# Patient Record
Sex: MALE | Race: WHITE | Employment: OTHER | URBAN - METROPOLITAN AREA
[De-identification: names, ages, dates, MRNs, and addresses within clinical notes are randomized per-mention and may not be internally consistent; named-entity substitution may affect disease eponyms.]

---

## 2019-03-23 ENCOUNTER — HOSPITAL ENCOUNTER (OUTPATIENT)
Dept: SLEEP CENTER | Age: 49
Discharge: HOME | End: 2019-03-23
Payer: COMMERCIAL

## 2019-03-23 DIAGNOSIS — R06.89 GASPING FOR BREATH: ICD-10-CM

## 2019-03-23 DIAGNOSIS — E66.9 CLASS 1 OBESITY WITH BODY MASS INDEX (BMI) OF 32.0 TO 32.9 IN ADULT, UNSPECIFIED OBESITY TYPE, UNSPECIFIED WHETHER SERIOUS COMORBIDITY PRESENT: Primary | ICD-10-CM

## 2019-03-23 DIAGNOSIS — G47.33 OSA (OBSTRUCTIVE SLEEP APNEA): ICD-10-CM

## 2019-03-23 DIAGNOSIS — R06.83 SNORES: ICD-10-CM

## 2019-03-23 PROCEDURE — 95810 POLYSOM 6/> YRS 4/> PARAM: CPT

## 2019-03-24 VITALS
WEIGHT: 225 LBS | SYSTOLIC BLOOD PRESSURE: 143 MMHG | OXYGEN SATURATION: 97 % | HEIGHT: 70 IN | DIASTOLIC BLOOD PRESSURE: 93 MMHG | HEART RATE: 59 BPM | BODY MASS INDEX: 32.21 KG/M2

## 2019-03-24 ASSESSMENT — SLEEP AND FATIGUE QUESTIONNAIRES
HOW LIKELY ARE YOU TO NOD OFF OR FALL ASLEEP WHILE WATCHING TV: 3
HOW LIKELY ARE YOU TO NOD OFF OR FALL ASLEEP IN A CAR, WHILE STOPPED FOR A FEW MINUTES IN TRAFFIC: 0
HOW LIKELY ARE YOU TO NOD OFF OR FALL ASLEEP WHILE SITTING AND TALKING TO SOMEONE: 1
HOW LIKELY ARE YOU TO NOD OFF OR FALL ASLEEP WHILE LYING DOWN TO REST IN THE AFTERNOON WHEN CIRCUMSTANCES PERMIT: 3
HOW LIKELY ARE YOU TO NOD OFF OR FALL ASLEEP WHILE SITTING QUIETLY AFTER LUNCH WITHOUT ALCOHOL: 1
ESS TOTAL SCORE: 14
HOW LIKELY ARE YOU TO NOD OFF OR FALL ASLEEP WHILE SITTING INACTIVE IN A PUBLIC PLACE: 1
HOW LIKELY ARE YOU TO NOD OFF OR FALL ASLEEP WHILE SITTING AND READING: 3
HOW LIKELY ARE YOU TO NOD OFF OR FALL ASLEEP WHEN YOU ARE A PASSENGER IN A CAR FOR AN HOUR WITHOUT A BREAK: 2

## 2019-03-26 ENCOUNTER — TELEPHONE (OUTPATIENT)
Dept: ADMINISTRATIVE | Age: 49
End: 2019-03-26

## 2019-04-05 PROBLEM — R94.31 ABNORMAL EKG: Status: ACTIVE | Noted: 2019-04-05

## 2019-04-11 ENCOUNTER — TELEPHONE (OUTPATIENT)
Dept: CARDIOLOGY CLINIC | Age: 49
End: 2019-04-11

## 2019-04-11 ENCOUNTER — OFFICE VISIT (OUTPATIENT)
Dept: CARDIOLOGY CLINIC | Age: 49
End: 2019-04-11
Payer: COMMERCIAL

## 2019-04-11 VITALS
DIASTOLIC BLOOD PRESSURE: 84 MMHG | BODY MASS INDEX: 31.92 KG/M2 | SYSTOLIC BLOOD PRESSURE: 122 MMHG | HEIGHT: 70 IN | RESPIRATION RATE: 16 BRPM | WEIGHT: 223 LBS | HEART RATE: 64 BPM

## 2019-04-11 DIAGNOSIS — R94.31 ABNORMAL EKG: ICD-10-CM

## 2019-04-11 DIAGNOSIS — R94.31 ABNORMAL EKG: Primary | ICD-10-CM

## 2019-04-11 PROCEDURE — G8427 DOCREV CUR MEDS BY ELIG CLIN: HCPCS | Performed by: INTERNAL MEDICINE

## 2019-04-11 PROCEDURE — 93000 ELECTROCARDIOGRAM COMPLETE: CPT | Performed by: INTERNAL MEDICINE

## 2019-04-11 PROCEDURE — 99244 OFF/OP CNSLTJ NEW/EST MOD 40: CPT | Performed by: INTERNAL MEDICINE

## 2019-04-11 PROCEDURE — G8417 CALC BMI ABV UP PARAM F/U: HCPCS | Performed by: INTERNAL MEDICINE

## 2019-04-11 RX ORDER — ERGOCALCIFEROL 1.25 MG/1
CAPSULE ORAL
Refills: 0 | COMMUNITY
Start: 2019-03-19

## 2019-04-11 NOTE — PROGRESS NOTES
activity: Not on file   Other Topics Concern    Not on file   Social History Narrative    Not on file       History reviewed. No pertinent family history. SUBJECTIVE: Wanda Nance presents to the office today for cardiology consult - dr. Khadra Barreto. He complains of various atypical symptoms, but also years of sob with exertion and denies   chest pain, palpitations, paroxysmal nocturnal dyspnea and syncope. Works as         Review of Systems:   Heart: as above   Lungs: as above   Eyes: denies changes in vision or discharge. Ears: denies changes in hearing or pain. Nose: denies epistaxis or masses   Throat: denies sore throat or trouble swallowing. Neuro: denies numbness, tingling, tremors. Skin: denies rashes or itching. : denies hematuria, dysuria   GI: denies vomiting, diarrhea   Psych: denies mood changed, anxiety, depression. all others negative. Physical Exam   /84   Pulse 64   Resp 16   Ht 5' 10\" (1.778 m)   Wt 223 lb (101.2 kg)   BMI 32.00 kg/m²   Constitutional: Oriented to person, place, and time. Well-developed and well-nourished. No distress. Head: Normocephalic and atraumatic. Eyes: EOM are normal. Pupils are equal, round, and reactive to light. Neck: Normal range of motion. Neck supple. No hepatojugular reflux and no JVD present. Carotid bruit is not present. No tracheal deviation present. No thyromegaly present. Cardiovascular: Normal rate, regular rhythm, normal heart sounds and intact distal pulses. Exam reveals no gallop and no friction rub. No murmur heard. Pulmonary/Chest: Effort normal and breath sounds normal. No respiratory distress. No wheezes. No rales. No tenderness. Abdominal: Soft. Bowel sounds are normal. No distension and no mass. No tenderness. No rebound and no guarding. Musculoskeletal: Normal range of motion. No edema and no tenderness. Lymphadenopathy:   No cervical adenopathy. No groin adenopathy.   Neurological: Alert and oriented to person, place, and time. Skin: Skin is warm and dry. No rash noted. Not diaphoretic. No erythema. Psychiatric: Normal mood and affect. Behavior is normal.     EKG:  normal EKG, normal sinus rhythm, unchanged from previous tracings.     ASSESSMENT AND PLAN:  Patient Active Problem List   Diagnosis    Abnormal EKG     NORMAL ekg at PCP and here  Normal exam  Likely non cardiac symptoms, but does do physical labor, so will do ETT-R      LIDIA DormanT[facility]

## 2019-04-15 ENCOUNTER — HOSPITAL ENCOUNTER (OUTPATIENT)
Dept: NON INVASIVE DIAGNOSTICS | Age: 49
Discharge: HOME | End: 2019-04-15
Payer: COMMERCIAL

## 2019-04-15 DIAGNOSIS — R94.31 ABNORMAL EKG: ICD-10-CM

## 2019-04-15 PROCEDURE — 93017 CV STRESS TEST TRACING ONLY: CPT

## 2019-04-15 PROCEDURE — 93016 CV STRESS TEST SUPVJ ONLY: CPT | Performed by: INTERNAL MEDICINE

## 2019-04-15 PROCEDURE — 93018 CV STRESS TEST I&R ONLY: CPT | Performed by: INTERNAL MEDICINE

## 2019-04-15 NOTE — PROCEDURES
Exercise Non-Nuclear Stress Test:    Cardiologist: Dr. Marvin Rodas    Date: 4/15/2019    Indications for study: Chest pain    1. Chest tightness at peak exercise  2. Exercise time: 5:30, METs: 7, MPHR: 80%  3. No new arrhythmias  4. 1.9 MM Horizontal ST depression in leads V5-6 in mid portion stage II, resolved to baseline in two minutes  5.  Positive exercise non-nuclear stress test

## 2019-04-16 ENCOUNTER — TELEPHONE (OUTPATIENT)
Dept: CARDIOLOGY CLINIC | Age: 49
End: 2019-04-16

## 2019-04-16 RX ORDER — METOPROLOL SUCCINATE 50 MG/1
50 TABLET, EXTENDED RELEASE ORAL DAILY
Qty: 30 TABLET | Refills: 5 | Status: SHIPPED | OUTPATIENT
Start: 2019-04-16

## 2019-04-16 RX ORDER — ATORVASTATIN CALCIUM 10 MG/1
10 TABLET, FILM COATED ORAL DAILY
Qty: 30 TABLET | Refills: 5 | Status: SHIPPED | OUTPATIENT
Start: 2019-04-16

## 2019-04-16 NOTE — TELEPHONE ENCOUNTER
After receiving his lipids from his family physician.  His Lipitor dose was changed per Dr. Darshan Romero to 40 mg qd     Pharmacy notified of the change

## 2019-04-16 NOTE — TELEPHONE ENCOUNTER
Per Dr. Cristopher Ordonez, Please have patient start on the following medications:     Toprol XL 50 mg q am  Lipitor 10 mg q am     Please make sure he is also taking a baby ASA 81 mg qd and follow-up in one month     Patient notified and instructed on all medications and follow-up.   He stated understanding

## 2019-05-03 PROBLEM — E78.2 MIXED HYPERLIPIDEMIA: Status: ACTIVE | Noted: 2019-05-03

## 2019-05-03 PROBLEM — I20.9 ANGINA PECTORIS (HCC): Status: ACTIVE | Noted: 2019-05-03

## 2019-05-07 ENCOUNTER — TELEPHONE (OUTPATIENT)
Dept: ADMINISTRATIVE | Age: 49
End: 2019-05-07

## 2019-05-07 NOTE — TELEPHONE ENCOUNTER
Pt calling to cancel his f/u after stress tomorrow with Dr. Matthew Parekh. States he is not going to follow with Dr. Matthew Parekh - he does not like \"being bullied\" into taking medications. Dr. Yemi Johnson office notified and apt cancelled.

## 2019-05-14 ENCOUNTER — HOSPITAL ENCOUNTER (OUTPATIENT)
Dept: SLEEP CENTER | Age: 49
Discharge: HOME | End: 2019-05-14
Payer: COMMERCIAL

## 2019-05-14 DIAGNOSIS — G47.33 OSA (OBSTRUCTIVE SLEEP APNEA): ICD-10-CM

## 2019-05-14 PROCEDURE — 95811 POLYSOM 6/>YRS CPAP 4/> PARM: CPT

## 2019-05-15 VITALS
SYSTOLIC BLOOD PRESSURE: 123 MMHG | BODY MASS INDEX: 32.21 KG/M2 | HEIGHT: 70 IN | WEIGHT: 225 LBS | OXYGEN SATURATION: 97 % | DIASTOLIC BLOOD PRESSURE: 80 MMHG | HEART RATE: 71 BPM

## 2019-05-22 NOTE — PROGRESS NOTES
[address] [address]                               SLEEP STUDY REPORT    PATIENT NAME: Montell Bernheim                       :        1970  MED REC NO:   [id number]                            ROOM:  ACCOUNT NO:   [de-identified]                           ADMIT DATE: 2019  PROVIDER:     Ct Trejo MD    DATE OF STUDY:  2019    TITRATION STUDY    INDICATION FOR STUDY:  The patient had a positive test for obstructive  sleep apnea on 2019 with an apnea/hypopnea index of 13 and lowest  oxygen saturation of 76%. The patient has a BMI of 32.3 and neck  circumference of 17.75 inches and an Miami Score Scale of 14/24. SLEEP ARCHITECTURE:  Total recording time was 382.9 minutes. Total  sleep time was 340 minutes. Sleep efficacy was 88.8%. Sleep latency  was 6.8 minutes and REM latency was 139.5 minutes. SLEEP STAGES:  The patient spent 5.3% in N1, 66.5% in N2, 12.2% in N3,  and 16% in REM sleep. RESPIRATORY EVENTS:  During the study, the patient had 45 apneas, which  of those 31 were central and 14 were obstructive. The longest duration  of an apneic event was 30.9 seconds. HYPOPNEAS:  During the study, the patient had 24 hypopneas. The longest  duration of the hypopneic events was 43 seconds. The apnea/hypopnea  index was 12.2. AROUSALS:  During the study, the patient had total of 39 arousals and  awakenings, which of those 26 were spontaneous. The index was 6.9. LEG MOVEMENTS:  During the study, the patient had total of 21 leg  movements, which of those only 2 were of periodic leg movement variety  and the leg movement index was 3.7. OXYGEN SATURATIONS:  The patient's mean oxygen saturation while awake on  room air was 95.5%. The patient's mean oxygen saturation during total  sleep was 94.2%.   The patient spent 5.4 minutes with oxygen saturations  below 88 and the lowest oxygen saturation was 79%. HEART RATE SUMMARY:  The patient's average heart rate while awake was  66.6 beats per minute. The patient's average heart rate during steady  sleep was 57.1 beats per minute. The patient was not reported to have  any PACs or PVCs. TITRATION:  The patient was started on a bilevel of 8/4 and pressures  were increased for elimination of events. Unfortunately, when pressures  were increased, the patient developed having central apneas with  worsening of his apnea/hypopnea index. This was consistent with  overtitration. The patient's lowest oxygen saturation was 82.0.    RECOMMENDATIONS:  This was an inadequate titration study. Would  recommend an Auto-BiPAP for this patient with a setting of EPAP of 4 to  10 and an IPAP setting of 8 to 15. The patient will need also a  20-minute ramp and a humidifier along with the medium Respironics  DreamWear nasal mask.         Rodney Carbajal MD      D: 05/21/2019 13:43:21       T: 05/21/2019 23:46:37     [email or web address]  Job#: [id number]     Doc#: [id number]    CC:

## 2024-02-05 ENCOUNTER — HOSPITAL ENCOUNTER (OUTPATIENT)
Age: 54
Discharge: HOME | End: 2024-02-05
Payer: COMMERCIAL

## 2024-02-05 LAB
BUN SERPL-MCNC: 19 MG/DL (ref 6–20)
CREAT SERPL-MCNC: 1.1 MG/DL (ref 0.7–1.2)
GFR SERPL CREATININE-BSD FRML MDRD: >60 ML/MIN/1.73M2

## 2024-02-05 PROCEDURE — 84520 ASSAY OF UREA NITROGEN: CPT

## 2024-02-05 PROCEDURE — 82565 ASSAY OF CREATININE: CPT

## 2024-02-05 PROCEDURE — 36415 COLL VENOUS BLD VENIPUNCTURE: CPT

## 2024-02-07 ENCOUNTER — HOSPITAL ENCOUNTER (OUTPATIENT)
Dept: CT IMAGING | Age: 54
Discharge: HOME | End: 2024-02-09
Payer: COMMERCIAL

## 2024-02-07 VITALS
BODY MASS INDEX: 31.5 KG/M2 | OXYGEN SATURATION: 98 % | HEART RATE: 52 BPM | RESPIRATION RATE: 18 BRPM | WEIGHT: 220 LBS | SYSTOLIC BLOOD PRESSURE: 138 MMHG | HEIGHT: 70 IN | DIASTOLIC BLOOD PRESSURE: 81 MMHG

## 2024-02-07 DIAGNOSIS — E78.5 HYPERLIPIDEMIA, UNSPECIFIED HYPERLIPIDEMIA TYPE: ICD-10-CM

## 2024-02-07 DIAGNOSIS — I10 HYPERTENSION, ESSENTIAL: ICD-10-CM

## 2024-02-07 DIAGNOSIS — R06.00 DYSPNEA, UNSPECIFIED TYPE: ICD-10-CM

## 2024-02-07 PROCEDURE — 75571 CT HRT W/O DYE W/CA TEST: CPT

## 2024-02-07 PROCEDURE — 2580000003 HC RX 258: Performed by: INTERNAL MEDICINE

## 2024-02-07 PROCEDURE — 6370000000 HC RX 637 (ALT 250 FOR IP): Performed by: INTERNAL MEDICINE

## 2024-02-07 RX ORDER — SODIUM CHLORIDE 0.9 % (FLUSH) 0.9 %
10 SYRINGE (ML) INJECTION
Status: ACTIVE | OUTPATIENT
Start: 2024-02-07 | End: 2024-02-08

## 2024-02-07 RX ORDER — NITROGLYCERIN 0.4 MG/1
0.8 TABLET SUBLINGUAL ONCE
Status: DISCONTINUED | OUTPATIENT
Start: 2024-02-07 | End: 2024-02-10 | Stop reason: HOSPADM

## 2024-02-07 RX ORDER — 0.9 % SODIUM CHLORIDE 0.9 %
1000 INTRAVENOUS SOLUTION INTRAVENOUS ONCE
Status: COMPLETED | OUTPATIENT
Start: 2024-02-07 | End: 2024-02-07

## 2024-02-07 RX ORDER — METOPROLOL TARTRATE 50 MG/1
100 TABLET, FILM COATED ORAL ONCE
Status: COMPLETED | OUTPATIENT
Start: 2024-02-07 | End: 2024-02-07

## 2024-02-07 RX ORDER — ROSUVASTATIN CALCIUM 40 MG/1
40 TABLET, COATED ORAL DAILY
COMMUNITY
Start: 2024-01-21

## 2024-02-07 RX ORDER — ROSUVASTATIN CALCIUM 20 MG/1
20 TABLET, COATED ORAL DAILY
COMMUNITY
Start: 2023-11-04

## 2024-02-07 RX ADMIN — SODIUM CHLORIDE 1000 ML: 9 INJECTION, SOLUTION INTRAVENOUS at 08:14

## 2024-02-07 RX ADMIN — METOPROLOL TARTRATE 100 MG: 50 TABLET ORAL at 08:14

## 2024-02-07 NOTE — PROCEDURES
0745Patient arrived via for CTA coronary arteries. Allergies reviewed, instructions given to  include protocol for heart rate, b/p, necessary medications that will be given, IV site and proper breath hold during scan. Questions answered and expressed understanding confirmed. Placed on monitoring devices for heart rate and rhythm, B/P taken,  IV flushed / started, flushed and prn adapter attached.      0800 Dr Main notified of patient arrival, history, test/lab results, home medications, vital signs and rhythm.  Orders received.

## 2024-02-07 NOTE — PROCEDURES
0945 Return from cta, exam cancelled by Dr Main d/t calcium score of 1551.      0950 pt d/c home with instructions to follow up with ordering physician and cardiology.    Dr Arreaga's office faxed to notify of procedure cancel.

## 2024-05-02 ENCOUNTER — HOSPITAL ENCOUNTER (EMERGENCY)
Age: 54
Discharge: ANOTHER HOSPITAL | End: 2024-05-02
Attending: EMERGENCY MEDICINE
Payer: COMMERCIAL

## 2024-05-02 ENCOUNTER — APPOINTMENT (OUTPATIENT)
Dept: GENERAL RADIOLOGY | Age: 54
End: 2024-05-02
Payer: COMMERCIAL

## 2024-05-02 VITALS
OXYGEN SATURATION: 98 % | TEMPERATURE: 98 F | BODY MASS INDEX: 33.72 KG/M2 | WEIGHT: 235 LBS | DIASTOLIC BLOOD PRESSURE: 81 MMHG | HEART RATE: 69 BPM | RESPIRATION RATE: 18 BRPM | SYSTOLIC BLOOD PRESSURE: 156 MMHG

## 2024-05-02 DIAGNOSIS — I20.0 UNSTABLE ANGINA (HCC): Primary | ICD-10-CM

## 2024-05-02 LAB
ALBUMIN SERPL-MCNC: 4.5 G/DL (ref 3.5–5.2)
ALP SERPL-CCNC: 63 U/L (ref 40–129)
ALT SERPL-CCNC: 39 U/L (ref 0–40)
AMYLASE SERPL-CCNC: 64 U/L (ref 20–100)
ANION GAP SERPL CALCULATED.3IONS-SCNC: 12 MMOL/L (ref 7–16)
AST SERPL-CCNC: 25 U/L (ref 0–39)
BILIRUB DIRECT SERPL-MCNC: <0.2 MG/DL (ref 0–0.3)
BILIRUB INDIRECT SERPL-MCNC: NORMAL MG/DL (ref 0–1)
BILIRUB SERPL-MCNC: 0.3 MG/DL (ref 0–1.2)
BNP SERPL-MCNC: <26 PG/ML (ref 0–125)
BUN SERPL-MCNC: 23 MG/DL (ref 6–20)
CALCIUM SERPL-MCNC: 9.1 MG/DL (ref 8.6–10.2)
CHLORIDE SERPL-SCNC: 100 MMOL/L (ref 98–107)
CK SERPL-CCNC: 403 U/L (ref 20–200)
CO2 SERPL-SCNC: 26 MMOL/L (ref 22–29)
CREAT SERPL-MCNC: 1.1 MG/DL (ref 0.7–1.2)
ERYTHROCYTE [DISTWIDTH] IN BLOOD BY AUTOMATED COUNT: 11.7 % (ref 11.5–15)
GFR, ESTIMATED: 80 ML/MIN/1.73M2
GLUCOSE SERPL-MCNC: 130 MG/DL (ref 74–99)
HCT VFR BLD AUTO: 39 % (ref 37–54)
HGB BLD-MCNC: 14.1 G/DL (ref 12.5–16.5)
INR PPP: 1.1
LIPASE SERPL-CCNC: 24 U/L (ref 13–60)
MAGNESIUM SERPL-MCNC: 2.1 MG/DL (ref 1.6–2.6)
MCH RBC QN AUTO: 32 PG (ref 26–35)
MCHC RBC AUTO-ENTMCNC: 36.2 G/DL (ref 32–34.5)
MCV RBC AUTO: 88.4 FL (ref 80–99.9)
PARTIAL THROMBOPLASTIN TIME: 26.8 SEC (ref 24.5–35.1)
PARTIAL THROMBOPLASTIN TIME: 66.3 SEC (ref 24.5–35.1)
PLATELET # BLD AUTO: 251 K/UL (ref 130–450)
PMV BLD AUTO: 10 FL (ref 7–12)
POTASSIUM SERPL-SCNC: 3.7 MMOL/L (ref 3.5–5)
PROT SERPL-MCNC: 7.2 G/DL (ref 6.4–8.3)
PROTHROMBIN TIME: 12.2 SEC (ref 9.3–12.4)
RBC # BLD AUTO: 4.41 M/UL (ref 3.8–5.8)
SODIUM SERPL-SCNC: 138 MMOL/L (ref 132–146)
TROPONIN I SERPL HS-MCNC: 8 NG/L (ref 0–11)
TROPONIN I SERPL HS-MCNC: 8 NG/L (ref 0–11)
WBC OTHER # BLD: 7.6 K/UL (ref 4.5–11.5)

## 2024-05-02 PROCEDURE — 6360000002 HC RX W HCPCS

## 2024-05-02 PROCEDURE — 71045 X-RAY EXAM CHEST 1 VIEW: CPT

## 2024-05-02 PROCEDURE — 6370000000 HC RX 637 (ALT 250 FOR IP): Performed by: EMERGENCY MEDICINE

## 2024-05-02 PROCEDURE — 6360000002 HC RX W HCPCS: Performed by: EMERGENCY MEDICINE

## 2024-05-02 PROCEDURE — 83880 ASSAY OF NATRIURETIC PEPTIDE: CPT

## 2024-05-02 PROCEDURE — 96368 THER/DIAG CONCURRENT INF: CPT

## 2024-05-02 PROCEDURE — 85027 COMPLETE CBC AUTOMATED: CPT

## 2024-05-02 PROCEDURE — 99285 EMERGENCY DEPT VISIT HI MDM: CPT

## 2024-05-02 PROCEDURE — 80053 COMPREHEN METABOLIC PANEL: CPT

## 2024-05-02 PROCEDURE — 82248 BILIRUBIN DIRECT: CPT

## 2024-05-02 PROCEDURE — 83690 ASSAY OF LIPASE: CPT

## 2024-05-02 PROCEDURE — 85730 THROMBOPLASTIN TIME PARTIAL: CPT

## 2024-05-02 PROCEDURE — 84484 ASSAY OF TROPONIN QUANT: CPT

## 2024-05-02 PROCEDURE — 96366 THER/PROPH/DIAG IV INF ADDON: CPT

## 2024-05-02 PROCEDURE — 96374 THER/PROPH/DIAG INJ IV PUSH: CPT

## 2024-05-02 PROCEDURE — 96375 TX/PRO/DX INJ NEW DRUG ADDON: CPT

## 2024-05-02 PROCEDURE — 96365 THER/PROPH/DIAG IV INF INIT: CPT

## 2024-05-02 PROCEDURE — 82150 ASSAY OF AMYLASE: CPT

## 2024-05-02 PROCEDURE — 93005 ELECTROCARDIOGRAM TRACING: CPT | Performed by: EMERGENCY MEDICINE

## 2024-05-02 PROCEDURE — 82550 ASSAY OF CK (CPK): CPT

## 2024-05-02 PROCEDURE — 83735 ASSAY OF MAGNESIUM: CPT

## 2024-05-02 PROCEDURE — 85610 PROTHROMBIN TIME: CPT

## 2024-05-02 RX ORDER — LABETALOL HYDROCHLORIDE 5 MG/ML
5 INJECTION, SOLUTION INTRAVENOUS ONCE
Status: DISCONTINUED | OUTPATIENT
Start: 2024-05-02 | End: 2024-05-02

## 2024-05-02 RX ORDER — LABETALOL HYDROCHLORIDE 5 MG/ML
INJECTION, SOLUTION INTRAVENOUS
Status: COMPLETED
Start: 2024-05-02 | End: 2024-05-02

## 2024-05-02 RX ORDER — NITROGLYCERIN 20 MG/100ML
5-200 INJECTION INTRAVENOUS CONTINUOUS
Status: DISCONTINUED | OUTPATIENT
Start: 2024-05-02 | End: 2024-05-02 | Stop reason: HOSPADM

## 2024-05-02 RX ORDER — HEPARIN SODIUM 1000 [USP'U]/ML
2000 INJECTION, SOLUTION INTRAVENOUS; SUBCUTANEOUS PRN
Status: DISCONTINUED | OUTPATIENT
Start: 2024-05-02 | End: 2024-05-02 | Stop reason: HOSPADM

## 2024-05-02 RX ORDER — LABETALOL HYDROCHLORIDE 5 MG/ML
10 INJECTION, SOLUTION INTRAVENOUS ONCE
Status: DISCONTINUED | OUTPATIENT
Start: 2024-05-02 | End: 2024-05-02

## 2024-05-02 RX ORDER — ASPIRIN 81 MG/1
324 TABLET, CHEWABLE ORAL ONCE
Status: DISCONTINUED | OUTPATIENT
Start: 2024-05-02 | End: 2024-05-02 | Stop reason: HOSPADM

## 2024-05-02 RX ORDER — HEPARIN SODIUM 10000 [USP'U]/100ML
1000 INJECTION, SOLUTION INTRAVENOUS CONTINUOUS
Status: DISCONTINUED | OUTPATIENT
Start: 2024-05-02 | End: 2024-05-02 | Stop reason: HOSPADM

## 2024-05-02 RX ORDER — LABETALOL HYDROCHLORIDE 5 MG/ML
5 INJECTION, SOLUTION INTRAVENOUS ONCE
Status: COMPLETED | OUTPATIENT
Start: 2024-05-02 | End: 2024-05-02

## 2024-05-02 RX ORDER — HEPARIN SODIUM 1000 [USP'U]/ML
4000 INJECTION, SOLUTION INTRAVENOUS; SUBCUTANEOUS PRN
Status: DISCONTINUED | OUTPATIENT
Start: 2024-05-02 | End: 2024-05-02 | Stop reason: HOSPADM

## 2024-05-02 RX ORDER — HEPARIN SODIUM 1000 [USP'U]/ML
4000 INJECTION, SOLUTION INTRAVENOUS; SUBCUTANEOUS ONCE
Status: COMPLETED | OUTPATIENT
Start: 2024-05-02 | End: 2024-05-02

## 2024-05-02 RX ADMIN — HEPARIN SODIUM 12 UNITS/KG/HR: 10000 INJECTION, SOLUTION INTRAVENOUS at 03:23

## 2024-05-02 RX ADMIN — LABETALOL HYDROCHLORIDE 5 MG: 5 INJECTION INTRAVENOUS at 03:35

## 2024-05-02 RX ADMIN — NITROGLYCERIN 15 MCG/MIN: 20 INJECTION INTRAVENOUS at 03:59

## 2024-05-02 RX ADMIN — NITROGLYCERIN 20 MCG/MIN: 20 INJECTION INTRAVENOUS at 03:27

## 2024-05-02 RX ADMIN — LABETALOL HYDROCHLORIDE 5 MG: 5 INJECTION, SOLUTION INTRAVENOUS at 03:35

## 2024-05-02 RX ADMIN — TICAGRELOR 180 MG: 90 TABLET ORAL at 03:13

## 2024-05-02 RX ADMIN — NITROGLYCERIN 10 MCG/MIN: 20 INJECTION INTRAVENOUS at 04:51

## 2024-05-02 RX ADMIN — NITROGLYCERIN 5 MCG/MIN: 20 INJECTION INTRAVENOUS at 05:36

## 2024-05-02 RX ADMIN — HEPARIN SODIUM 4000 UNITS: 1000 INJECTION INTRAVENOUS; SUBCUTANEOUS at 03:16

## 2024-05-02 NOTE — ED PROVIDER NOTES
HPI:  5/2/24, Time: 3:01 AM EDT         Brett David is a 54 y.o. male presenting to the ED for chest heaviness going to his left armpit with shortness of breath nausea and burping but no abdominal pain, beginning months ago.  The complaint has been persistent, moderate in severity, and worsened by nothing.  He is still having chest heaviness now but he has some nonspecific left arm pain that we had he has known coronary artery disease and is actually supposed to have had a bypass earlier but he was not able to do what they want to have it this Thursday he is even though his EKG is unremarkable we had no he has unstable angina we will start him on aspirin Brilinta heparin and nitroglycerin because of his known coronary artery disease    ROS:   Pertinent positives and negatives are stated within HPI, all other systems reviewed and are negative.  --------------------------------------------- PAST HISTORY ---------------------------------------------  Past Medical History:  has a past medical history of Kidney stone and Mixed hyperlipidemia.    Past Surgical History:  has a past surgical history that includes Tonsillectomy (CHILD); Knee arthroscopy (01/01/2000); Nasal septum surgery (01/01/1992); lymph node biopsy (01/01/2004); and Endoscopy, colon, diagnostic.    Social History:  reports that he has never smoked. He has never used smokeless tobacco. He reports current drug use. Drug: Marijuana (Weed). He reports that he does not drink alcohol.    Family History: family history includes Heart Disease in his maternal grandfather and maternal grandmother.     The patient’s home medications have been reviewed.    Allergies: Shellfish-derived products and Vicodin [hydrocodone-acetaminophen]    ---------------------------------------------------PHYSICAL EXAM--------------------------------------    Constitutional/General: Alert and oriented x3, well appearing, non toxic in NAD  Head: Normocephalic and atraumatic  Eyes:

## 2024-05-04 LAB
EKG ATRIAL RATE: 73 BPM
EKG P AXIS: 53 DEGREES
EKG P-R INTERVAL: 178 MS
EKG Q-T INTERVAL: 380 MS
EKG QRS DURATION: 94 MS
EKG QTC CALCULATION (BAZETT): 418 MS
EKG R AXIS: 48 DEGREES
EKG T AXIS: 37 DEGREES
EKG VENTRICULAR RATE: 73 BPM

## 2024-05-04 PROCEDURE — 93010 ELECTROCARDIOGRAM REPORT: CPT | Performed by: INTERNAL MEDICINE

## 2024-06-06 ENCOUNTER — APPOINTMENT (OUTPATIENT)
Dept: CT IMAGING | Age: 54
End: 2024-06-06
Payer: COMMERCIAL

## 2024-06-06 ENCOUNTER — HOSPITAL ENCOUNTER (EMERGENCY)
Age: 54
Discharge: HOME | End: 2024-06-06
Attending: STUDENT IN AN ORGANIZED HEALTH CARE EDUCATION/TRAINING PROGRAM
Payer: COMMERCIAL

## 2024-06-06 VITALS
HEART RATE: 66 BPM | DIASTOLIC BLOOD PRESSURE: 88 MMHG | SYSTOLIC BLOOD PRESSURE: 158 MMHG | RESPIRATION RATE: 18 BRPM | TEMPERATURE: 97.8 F | OXYGEN SATURATION: 98 %

## 2024-06-06 DIAGNOSIS — K59.00 CONSTIPATION, UNSPECIFIED CONSTIPATION TYPE: ICD-10-CM

## 2024-06-06 DIAGNOSIS — N20.1 URETERIC STONE: Primary | ICD-10-CM

## 2024-06-06 LAB
ALBUMIN SERPL-MCNC: 4.5 G/DL (ref 3.5–5.2)
ALP SERPL-CCNC: 89 U/L (ref 40–129)
ALT SERPL-CCNC: 30 U/L (ref 0–40)
ANION GAP SERPL CALCULATED.3IONS-SCNC: 13 MMOL/L (ref 7–16)
AST SERPL-CCNC: 16 U/L (ref 0–39)
BACTERIA URNS QL MICRO: ABNORMAL
BASOPHILS # BLD: 0.02 K/UL (ref 0–0.2)
BASOPHILS NFR BLD: 0 % (ref 0–2)
BILIRUB SERPL-MCNC: 0.3 MG/DL (ref 0–1.2)
BILIRUB UR QL STRIP: NEGATIVE
BUN BLD-MCNC: 17 MG/DL (ref 6–20)
BUN SERPL-MCNC: 17 MG/DL (ref 6–20)
CALCIUM SERPL-MCNC: 9 MG/DL (ref 8.6–10.2)
CHLORIDE BLD-SCNC: 106 MMOL/L (ref 100–108)
CHLORIDE SERPL-SCNC: 104 MMOL/L (ref 98–107)
CLARITY UR: CLEAR
CO2 BLD CALC-SCNC: 24 MMOL/L (ref 22–29)
CO2 SERPL-SCNC: 23 MMOL/L (ref 22–29)
COLOR UR: YELLOW
CREAT BLD-MCNC: 0.9 MG/DL (ref 0.7–1.2)
CREAT SERPL-MCNC: 1 MG/DL (ref 0.7–1.2)
EGFR, POC: >90 ML/MIN/1.73M2
EOSINOPHIL # BLD: 0.24 K/UL (ref 0.05–0.5)
EOSINOPHILS RELATIVE PERCENT: 4 % (ref 0–6)
ERYTHROCYTE [DISTWIDTH] IN BLOOD BY AUTOMATED COUNT: 11.9 % (ref 11.5–15)
GFR, ESTIMATED: 87 ML/MIN/1.73M2
GLUCOSE BLD-MCNC: 135 MG/DL (ref 74–99)
GLUCOSE SERPL-MCNC: 135 MG/DL (ref 74–99)
GLUCOSE UR STRIP-MCNC: NEGATIVE MG/DL
HCT VFR BLD AUTO: 36.5 % (ref 37–54)
HGB BLD-MCNC: 12.7 G/DL (ref 12.5–16.5)
HGB UR QL STRIP.AUTO: ABNORMAL
IMM GRANULOCYTES # BLD AUTO: <0.03 K/UL (ref 0–0.58)
IMM GRANULOCYTES NFR BLD: 0 % (ref 0–5)
KETONES UR STRIP-MCNC: NEGATIVE MG/DL
LACTATE BLDV-SCNC: 1.5 MMOL/L (ref 0.5–2.2)
LEUKOCYTE ESTERASE UR QL STRIP: NEGATIVE
LIPASE SERPL-CCNC: 31 U/L (ref 13–60)
LYMPHOCYTES NFR BLD: 2.76 K/UL (ref 1.5–4)
LYMPHOCYTES RELATIVE PERCENT: 43 % (ref 20–42)
MCH RBC QN AUTO: 30.9 PG (ref 26–35)
MCHC RBC AUTO-ENTMCNC: 34.8 G/DL (ref 32–34.5)
MCV RBC AUTO: 88.8 FL (ref 80–99.9)
MONOCYTES NFR BLD: 0.61 K/UL (ref 0.1–0.95)
MONOCYTES NFR BLD: 10 % (ref 2–12)
NEUTROPHILS NFR BLD: 43 % (ref 43–80)
NEUTS SEG NFR BLD: 2.8 K/UL (ref 1.8–7.3)
NITRITE UR QL STRIP: NEGATIVE
PH UR STRIP: 5.5 [PH] (ref 5–9)
PLATELET # BLD AUTO: 254 K/UL (ref 130–450)
PMV BLD AUTO: 9.9 FL (ref 7–12)
POC ANION GAP: 11 MMOL/L (ref 7–16)
POTASSIUM BLD-SCNC: 3.6 MMOL/L (ref 3.5–5)
POTASSIUM SERPL-SCNC: 3.8 MMOL/L (ref 3.5–5)
PROT SERPL-MCNC: 7.2 G/DL (ref 6.4–8.3)
PROT UR STRIP-MCNC: NEGATIVE MG/DL
RBC # BLD AUTO: 4.11 M/UL (ref 3.8–5.8)
RBC #/AREA URNS HPF: ABNORMAL /HPF
SODIUM BLD-SCNC: 141 MMOL/L (ref 132–146)
SODIUM SERPL-SCNC: 140 MMOL/L (ref 132–146)
SP GR UR STRIP: 1.01 (ref 1–1.03)
UROBILINOGEN UR STRIP-ACNC: 0.2 EU/DL (ref 0–1)
WBC #/AREA URNS HPF: ABNORMAL /HPF
WBC OTHER # BLD: 6.4 K/UL (ref 4.5–11.5)

## 2024-06-06 PROCEDURE — 74177 CT ABD & PELVIS W/CONTRAST: CPT

## 2024-06-06 PROCEDURE — 6360000002 HC RX W HCPCS: Performed by: STUDENT IN AN ORGANIZED HEALTH CARE EDUCATION/TRAINING PROGRAM

## 2024-06-06 PROCEDURE — 81001 URINALYSIS AUTO W/SCOPE: CPT

## 2024-06-06 PROCEDURE — 83605 ASSAY OF LACTIC ACID: CPT

## 2024-06-06 PROCEDURE — 82947 ASSAY GLUCOSE BLOOD QUANT: CPT

## 2024-06-06 PROCEDURE — 82565 ASSAY OF CREATININE: CPT

## 2024-06-06 PROCEDURE — 83690 ASSAY OF LIPASE: CPT

## 2024-06-06 PROCEDURE — 2580000003 HC RX 258: Performed by: STUDENT IN AN ORGANIZED HEALTH CARE EDUCATION/TRAINING PROGRAM

## 2024-06-06 PROCEDURE — 84520 ASSAY OF UREA NITROGEN: CPT

## 2024-06-06 PROCEDURE — 80051 ELECTROLYTE PANEL: CPT

## 2024-06-06 PROCEDURE — 85025 COMPLETE CBC W/AUTO DIFF WBC: CPT

## 2024-06-06 PROCEDURE — 80053 COMPREHEN METABOLIC PANEL: CPT

## 2024-06-06 PROCEDURE — 96374 THER/PROPH/DIAG INJ IV PUSH: CPT

## 2024-06-06 PROCEDURE — 6360000004 HC RX CONTRAST MEDICATION: Performed by: RADIOLOGY

## 2024-06-06 PROCEDURE — 99285 EMERGENCY DEPT VISIT HI MDM: CPT

## 2024-06-06 PROCEDURE — 96375 TX/PRO/DX INJ NEW DRUG ADDON: CPT

## 2024-06-06 RX ORDER — ONDANSETRON 2 MG/ML
4 INJECTION INTRAMUSCULAR; INTRAVENOUS ONCE
Status: COMPLETED | OUTPATIENT
Start: 2024-06-06 | End: 2024-06-06

## 2024-06-06 RX ORDER — KETOROLAC TROMETHAMINE 30 MG/ML
15 INJECTION, SOLUTION INTRAMUSCULAR; INTRAVENOUS ONCE
Status: DISCONTINUED | OUTPATIENT
Start: 2024-06-06 | End: 2024-06-06

## 2024-06-06 RX ORDER — MORPHINE SULFATE 4 MG/ML
4 INJECTION, SOLUTION INTRAMUSCULAR; INTRAVENOUS ONCE
Status: COMPLETED | OUTPATIENT
Start: 2024-06-06 | End: 2024-06-06

## 2024-06-06 RX ORDER — OXYCODONE HYDROCHLORIDE 5 MG/1
5 TABLET ORAL EVERY 6 HOURS PRN
Qty: 8 TABLET | Refills: 0 | Status: SHIPPED | OUTPATIENT
Start: 2024-06-06 | End: 2024-06-11

## 2024-06-06 RX ORDER — POLYETHYLENE GLYCOL 3350 17 G/17G
17 POWDER, FOR SOLUTION ORAL DAILY PRN
COMMUNITY

## 2024-06-06 RX ORDER — 0.9 % SODIUM CHLORIDE 0.9 %
1000 INTRAVENOUS SOLUTION INTRAVENOUS ONCE
Status: COMPLETED | OUTPATIENT
Start: 2024-06-06 | End: 2024-06-06

## 2024-06-06 RX ORDER — FENTANYL CITRATE 50 UG/ML
50 INJECTION, SOLUTION INTRAMUSCULAR; INTRAVENOUS ONCE
Status: DISCONTINUED | OUTPATIENT
Start: 2024-06-06 | End: 2024-06-06

## 2024-06-06 RX ORDER — TAMSULOSIN HYDROCHLORIDE 0.4 MG/1
0.4 CAPSULE ORAL DAILY
Qty: 30 CAPSULE | Refills: 0 | Status: SHIPPED | OUTPATIENT
Start: 2024-06-06

## 2024-06-06 RX ORDER — DIPHENHYDRAMINE HYDROCHLORIDE 50 MG/ML
25 INJECTION INTRAMUSCULAR; INTRAVENOUS ONCE
Status: COMPLETED | OUTPATIENT
Start: 2024-06-06 | End: 2024-06-06

## 2024-06-06 RX ORDER — ONDANSETRON 4 MG/1
4 TABLET, ORALLY DISINTEGRATING ORAL 3 TIMES DAILY PRN
Qty: 10 TABLET | Refills: 0 | Status: SHIPPED | OUTPATIENT
Start: 2024-06-06

## 2024-06-06 RX ADMIN — MORPHINE SULFATE 4 MG: 4 INJECTION, SOLUTION INTRAMUSCULAR; INTRAVENOUS at 01:11

## 2024-06-06 RX ADMIN — ONDANSETRON 4 MG: 2 INJECTION INTRAMUSCULAR; INTRAVENOUS at 01:11

## 2024-06-06 RX ADMIN — WATER 125 MG: 1 INJECTION INTRAMUSCULAR; INTRAVENOUS; SUBCUTANEOUS at 02:12

## 2024-06-06 RX ADMIN — SODIUM CHLORIDE 1000 ML: 9 INJECTION, SOLUTION INTRAVENOUS at 01:10

## 2024-06-06 RX ADMIN — IOPAMIDOL 75 ML: 755 INJECTION, SOLUTION INTRAVENOUS at 02:25

## 2024-06-06 RX ADMIN — DIPHENHYDRAMINE HYDROCHLORIDE 25 MG: 50 INJECTION INTRAMUSCULAR; INTRAVENOUS at 02:12

## 2024-06-06 ASSESSMENT — ENCOUNTER SYMPTOMS
EYE REDNESS: 0
SINUS PRESSURE: 0
EYE DISCHARGE: 0
BACK PAIN: 0
NAUSEA: 0
ABDOMINAL PAIN: 1
COUGH: 0
WHEEZING: 0
SORE THROAT: 0
VOMITING: 0
DIARRHEA: 0
EYE PAIN: 0
CONSTIPATION: 1
SHORTNESS OF BREATH: 0

## 2024-06-06 ASSESSMENT — PAIN - FUNCTIONAL ASSESSMENT: PAIN_FUNCTIONAL_ASSESSMENT: NONE - DENIES PAIN

## 2024-06-06 ASSESSMENT — PAIN DESCRIPTION - LOCATION: LOCATION: ABDOMEN

## 2024-06-06 ASSESSMENT — PAIN SCALES - GENERAL: PAINLEVEL_OUTOF10: 6

## 2024-06-06 NOTE — DISCHARGE INSTRUCTIONS
If you develop fevers, inability to keep anything down despite nausea medicine, severe uncontrolled pain please go to emergency department for evaluation      Take MiraLAX 1 capful twice daily.   yes

## 2024-06-06 NOTE — ED PROVIDER NOTES
Eosinophils % 4 0 - 6 %    Basophils % 0 0.0 - 2.0 %    Immature Granulocytes % 0 0.0 - 5.0 %    Neutrophils Absolute 2.80 1.80 - 7.30 k/uL    Lymphocytes Absolute 2.76 1.50 - 4.00 k/uL    Monocytes Absolute 0.61 0.10 - 0.95 k/uL    Eosinophils Absolute 0.24 0.05 - 0.50 k/uL    Basophils Absolute 0.02 0.00 - 0.20 k/uL    Immature Granulocytes Absolute <0.03 0.00 - 0.58 k/uL   Comprehensive Metabolic Panel w/ Reflex to MG   Result Value Ref Range    Sodium 140 132 - 146 mmol/L    Potassium 3.8 3.5 - 5.0 mmol/L    Chloride 104 98 - 107 mmol/L    CO2 23 22 - 29 mmol/L    Anion Gap 13 7 - 16 mmol/L    Glucose 135 (H) 74 - 99 mg/dL    BUN 17 6 - 20 mg/dL    Creatinine 1.0 0.70 - 1.20 mg/dL    Est, Glom Filt Rate 87 >60 mL/min/1.73m2    Calcium 9.0 8.6 - 10.2 mg/dL    Total Protein 7.2 6.4 - 8.3 g/dL    Albumin 4.5 3.5 - 5.2 g/dL    Total Bilirubin 0.3 0.0 - 1.2 mg/dL    Alkaline Phosphatase 89 40 - 129 U/L    ALT 30 0 - 40 U/L    AST 16 0 - 39 U/L   Lactic Acid   Result Value Ref Range    Lactic Acid 1.5 0.5 - 2.2 mmol/L   Lipase   Result Value Ref Range    Lipase 31 13 - 60 U/L   Urinalysis with Microscopic   Result Value Ref Range    Color, UA Yellow Yellow    Turbidity UA Clear Clear    Glucose, Ur NEGATIVE NEGATIVE mg/dL    Bilirubin, Urine NEGATIVE NEGATIVE    Ketones, Urine NEGATIVE NEGATIVE mg/dL    Specific Gravity, UA 1.015 1.005 - 1.030    Urine Hgb LARGE (A) NEGATIVE    pH, Urine 5.5 5.0 - 9.0    Protein, UA NEGATIVE NEGATIVE mg/dL    Urobilinogen, Urine 0.2 0.0 - 1.0 EU/dL    Nitrite, Urine NEGATIVE NEGATIVE    Leukocyte Esterase, Urine NEGATIVE NEGATIVE    WBC, UA 0 TO 5 0 TO 5 /HPF    RBC, UA 10 TO 20 (A) 0 TO 2 /HPF    Bacteria, UA TRACE (A) None   POC PANEL (C8)   Result Value Ref Range    POC Sodium 141 132 - 146 mmol/L    POC Potassium 3.6 3.5 - 5.0 mmol/L    POC Chloride 106 100 - 108 mmol/L    POC TCO2 24 22 - 29 mmol/L    POC Anion Gap 11 7 - 16 mmol/L    POC Glucose 135 (H) 74 - 99 mg/dL    POC BUN

## 2024-07-03 ENCOUNTER — HOSPITAL ENCOUNTER (OUTPATIENT)
Dept: CARDIAC REHAB | Age: 54
Setting detail: THERAPIES SERIES
Discharge: HOME | End: 2024-07-03
Payer: COMMERCIAL

## 2024-07-03 VITALS — OXYGEN SATURATION: 98 % | BODY MASS INDEX: 33.07 KG/M2 | HEIGHT: 70 IN | WEIGHT: 231 LBS | RESPIRATION RATE: 20 BRPM

## 2024-07-03 PROCEDURE — 93798 PHYS/QHP OP CAR RHAB W/ECG: CPT

## 2024-07-03 PROCEDURE — 93797 PHYS/QHP OP CAR RHAB WO ECG: CPT

## 2024-07-03 RX ORDER — BUSPIRONE HYDROCHLORIDE 5 MG/1
5 TABLET ORAL 2 TIMES DAILY
COMMUNITY

## 2024-07-03 RX ORDER — ASPIRIN 81 MG/1
81 TABLET, CHEWABLE ORAL DAILY
COMMUNITY

## 2024-07-03 RX ORDER — MINOCYCLINE HYDROCHLORIDE 100 MG/1
100 CAPSULE ORAL 2 TIMES DAILY
COMMUNITY

## 2024-07-03 RX ORDER — AMLODIPINE BESYLATE 2.5 MG/1
2.5 TABLET ORAL DAILY
COMMUNITY

## 2024-07-03 RX ORDER — METOPROLOL SUCCINATE 25 MG/1
25 TABLET, EXTENDED RELEASE ORAL 2 TIMES DAILY
COMMUNITY

## 2024-07-03 ASSESSMENT — EXERCISE STRESS TEST
PEAK_BP: 164/99
PEAK_HR: 92
PEAK_RPE: 10
PEAK_BP: 164/99

## 2024-07-03 ASSESSMENT — PATIENT HEALTH QUESTIONNAIRE - PHQ9
6. FEELING BAD ABOUT YOURSELF - OR THAT YOU ARE A FAILURE OR HAVE LET YOURSELF OR YOUR FAMILY DOWN: NOT AT ALL
SUM OF ALL RESPONSES TO PHQ QUESTIONS 1-9: 0
7. TROUBLE CONCENTRATING ON THINGS, SUCH AS READING THE NEWSPAPER OR WATCHING TELEVISION: NOT AT ALL
5. POOR APPETITE OR OVEREATING: NOT AT ALL
4. FEELING TIRED OR HAVING LITTLE ENERGY: NOT AT ALL
8. MOVING OR SPEAKING SO SLOWLY THAT OTHER PEOPLE COULD HAVE NOTICED. OR THE OPPOSITE, BEING SO FIGETY OR RESTLESS THAT YOU HAVE BEEN MOVING AROUND A LOT MORE THAN USUAL: NOT AT ALL
3. TROUBLE FALLING OR STAYING ASLEEP: NOT AT ALL
1. LITTLE INTEREST OR PLEASURE IN DOING THINGS: NOT AT ALL
SUM OF ALL RESPONSES TO PHQ QUESTIONS 1-9: 0
10. IF YOU CHECKED OFF ANY PROBLEMS, HOW DIFFICULT HAVE THESE PROBLEMS MADE IT FOR YOU TO DO YOUR WORK, TAKE CARE OF THINGS AT HOME, OR GET ALONG WITH OTHER PEOPLE: NOT DIFFICULT AT ALL
SUM OF ALL RESPONSES TO PHQ QUESTIONS 1-9: 0
SUM OF ALL RESPONSES TO PHQ QUESTIONS 1-9: 0
9. THOUGHTS THAT YOU WOULD BE BETTER OFF DEAD, OR OF HURTING YOURSELF: NOT AT ALL
SUM OF ALL RESPONSES TO PHQ9 QUESTIONS 1 & 2: 0
2. FEELING DOWN, DEPRESSED OR HOPELESS: NOT AT ALL

## 2024-07-03 ASSESSMENT — EJECTION FRACTION: EF_VALUE: 64

## 2024-07-03 ASSESSMENT — LIFESTYLE VARIABLES: SMOKELESS_TOBACCO: NO

## 2024-07-03 ASSESSMENT — NEW YORK HEART ASSOCIATION (NYHA) CLASSIFICATION: NYHA FUNCTIONAL CLASS: NO NYHA CLASS OR UNABLE TO DETERMINE

## 2024-07-03 NOTE — PROGRESS NOTES
07/03/24 1006   Treatment Diagnosis   Treatment Diagnosis 1 CABG  (CABG X4)   Referral Date 06/17/24   Significant Cardiovascular History Previous CABG   Oxygen Saturation / Titration    Stages of Change  Maintenance   Oxygen Intervention   Oxygen Use No   Individual Treatment Plan   ITP Visit Type Initial assessment   1st Date of Exercise  07/03/24   Visit #/Total Visits 2/36   EF% 64 %   Risk Stratification Low   ITP Exercise;Psychosocial;Tobacco;Nutrition;Education   Exercise    Stages of Change Action   Test Six minute walk test   Assisted Devices None   Data Measured Before Walk   Heart Rate 63   Blood Pressure 128/83   O2 Saturation 98   O2 Device Room air   RPE 6   Data Measured during Walk   Indicate Mode of RPE 6 minutes   RPE Data Measured During   Treadmill Speed 1.4 mph   Treadmill Grade 0 %   Symptoms   (none)   Any problems while exercising none   Do You Have Shortness of Breath No   Describe Type of Pain You Are Having none   Peak RPE 10   NYHA Heart Failure Classification No NYHA class or unable to determine   Data Measured Immediately After Walk   Distance Walked in  mi   Distance Walked (miles) 0.13   Distance Walked in Feet (Calculated) 686.4 ft   Peak Heart Rate 92   Peak Blood Pressure (!) 164/99   RPE 10   O2 Saturation 100   Data Measured at 5 Minutes After Walk   Heart Rate 68   Blood Pressure 132/80   SpO2 98 %   Comments Patient completed test with no complications. With a speed of 1.4 mph with MET level 2.1   Exercise Prescription   Mode Treadmill;Bike;Stepper;Ergometer;Elliptical  (Stepper=step bench....work up to the elliptical)   Frequency per week 3 x's wk   Duration Per Session 30-60  (work up to)   Intensity METS       3-4  (2.1-3 for first exercise session 7/3/2024)   RPE 10-12  (work up to)   Progression Have patient progress to a duration of 60 min and a MET level of 3-4 by session 36/36   Symptoms with Exercise Other (comment)  (none)   Target Heart Rate 101-130  (UNM -15%)

## 2024-07-03 NOTE — CARDIO/PULMONARY
PT. SEEN IN CARDIAC REHAB TODAY FOR INITIAL EVALUATION AND EXERCISE. PT. S/P CABG X4 ON 5-6-24. STERNAL AND RIGHT LEG INCISION IS HEALING WELL. PT. DID HAVE SOME ISSUES WITH HIS STERNAL INCISION DRAINING. STERNAL AND  RIGHT LEG INCISIONS ARE  HEALING WELL WITHOUT EVIDENCE OF INFECTION. PT. STATES HE STILL HAS SOME PAIN AROUND HIS STERNAL INCISION SITE. EJECTION FRACTION IS 64%. PT. STATES HE DOES GET DIZZY AT TIMES WHEN STANDING UP AND THEY ARE ADJUSTING HIS BP MEDICATION. HE DOES HAV PAIN IN HIS KNEES, BACK, ELBOWS, HANDS AND FEET. HE STATES HE DOES NOT HAVE ANY EXERCISE OR EQUIPMENT LIMITATIONS. HE DOES GET SOME SWELLING IN HIS RIGHT LEG. PHQ9 SCORE IS A 0. DENIES ANY S/S OF DEPRESSION. FALLS RISK IS A 4/8. HIGH D/T PREVIOUS FALLS. PT.S GOAL IS TO IMPROVE HIS STRENGTH AND STAMINA THROUGH EXERCISE AT CARDIAC REHAB.

## 2024-07-15 ENCOUNTER — HOSPITAL ENCOUNTER (OUTPATIENT)
Dept: CARDIAC REHAB | Age: 54
Setting detail: THERAPIES SERIES
Discharge: HOME | End: 2024-07-15
Payer: COMMERCIAL

## 2024-07-15 PROCEDURE — 93797 PHYS/QHP OP CAR RHAB WO ECG: CPT

## 2024-07-15 PROCEDURE — 93798 PHYS/QHP OP CAR RHAB W/ECG: CPT

## 2024-07-15 NOTE — CONSULTS
Initial Nutrition Assessment      Date: 7/15/24   Time: 11:00am   Patient Name: Brett David   Referring Clinician: Alberto Arreaga MD   Fax: [phone]   Reason for Visit: Pt seen in cardiac rehab for initial nutrition assessment and dietary counseling, s/p CABG x 4.      Goals:  Eat a diet rich in a variety of fruits, vegetables, whole grains, lean proteins, and healthy fats for improved heart health and wt control.  Portion control and meal balance to promote wt loss to goal.   Increase fiber content through increase in fresh fruits, vegetables and whole grains.     Current Eating Pattern:  Pt currently eats a large breakfast and large dinner meal.  His breakfast consists of 6 eggs (only 3 yolks) and 2 toast or 2 pancakes.  Dinner is red meat or chicken with starch and occasional vegetables.  He includes fish once per month.  Drinks lactaid milk or oat or almond milk.  Drinks water and gatorade and tea.  Has chocolate chips and ice cream as sweet snacks.  He has tried to eliminate lunch meats since his surgery.      Past Nutrition Hx:  Pt reports drinking 2 liters of regular pop a day for many years but eliminated about a year ago.  Regularly included fast food options for lunch when working.      Allergies/Food Sensitivities:   NKFA.  Pt reports some lactose intolerance.     Dietary Limitations/Time/Prep Issues:  Pt is a  and is on the go most of the day.  It was more convenient to grab fast food when working but is aware he needs to limit so has stopped eating lunch most days.       Stress/Environment Issues that may be affecting po intake:  Pt states he eats what is available or what everyone else is eating despite knowing it is not the best choice.  His wife does the grocery shopping and meal prep so will share diet information with her.      Weight Hx:  Pt reports gaining 10 pounds since surgery in May.  He would like to lose wt to achieve a wt loss goal of ~30 pounds.  Goal wt = 200 lbs.

## 2024-07-17 ENCOUNTER — HOSPITAL ENCOUNTER (OUTPATIENT)
Dept: CARDIAC REHAB | Age: 54
Setting detail: THERAPIES SERIES
Discharge: HOME | End: 2024-07-17
Payer: COMMERCIAL

## 2024-07-17 PROCEDURE — 93798 PHYS/QHP OP CAR RHAB W/ECG: CPT

## 2024-07-19 ENCOUNTER — HOSPITAL ENCOUNTER (OUTPATIENT)
Dept: CARDIAC REHAB | Age: 54
Setting detail: THERAPIES SERIES
Discharge: HOME | End: 2024-07-19
Payer: COMMERCIAL

## 2024-07-19 PROCEDURE — 93798 PHYS/QHP OP CAR RHAB W/ECG: CPT

## 2024-07-22 ENCOUNTER — HOSPITAL ENCOUNTER (OUTPATIENT)
Dept: CARDIAC REHAB | Age: 54
Setting detail: THERAPIES SERIES
Discharge: HOME | End: 2024-07-22
Payer: COMMERCIAL

## 2024-07-22 PROCEDURE — 93798 PHYS/QHP OP CAR RHAB W/ECG: CPT

## 2024-07-24 ENCOUNTER — HOSPITAL ENCOUNTER (OUTPATIENT)
Dept: CARDIAC REHAB | Age: 54
Setting detail: THERAPIES SERIES
Discharge: HOME | End: 2024-07-24
Payer: COMMERCIAL

## 2024-07-24 PROCEDURE — 93798 PHYS/QHP OP CAR RHAB W/ECG: CPT

## 2024-07-29 ENCOUNTER — HOSPITAL ENCOUNTER (OUTPATIENT)
Dept: CARDIAC REHAB | Age: 54
Setting detail: THERAPIES SERIES
Discharge: HOME | End: 2024-07-29
Payer: COMMERCIAL

## 2024-07-29 PROCEDURE — 93798 PHYS/QHP OP CAR RHAB W/ECG: CPT

## 2024-08-07 ENCOUNTER — HOSPITAL ENCOUNTER (OUTPATIENT)
Dept: CARDIAC REHAB | Age: 54
Setting detail: THERAPIES SERIES
Discharge: HOME | End: 2024-08-07
Payer: COMMERCIAL

## 2024-08-07 PROCEDURE — 93798 PHYS/QHP OP CAR RHAB W/ECG: CPT

## 2024-08-16 ENCOUNTER — HOSPITAL ENCOUNTER (OUTPATIENT)
Dept: CARDIAC REHAB | Age: 54
Setting detail: THERAPIES SERIES
Discharge: HOME | End: 2024-08-16
Payer: COMMERCIAL

## 2024-08-23 ENCOUNTER — HOSPITAL ENCOUNTER (OUTPATIENT)
Dept: CARDIAC REHAB | Age: 54
Setting detail: THERAPIES SERIES
Discharge: HOME | End: 2024-08-23
Payer: COMMERCIAL

## 2024-08-23 PROCEDURE — 93798 PHYS/QHP OP CAR RHAB W/ECG: CPT

## 2024-08-30 ENCOUNTER — HOSPITAL ENCOUNTER (OUTPATIENT)
Dept: CARDIAC REHAB | Age: 54
Setting detail: THERAPIES SERIES
Discharge: HOME | End: 2024-08-30
Payer: COMMERCIAL

## 2024-08-30 PROCEDURE — 93798 PHYS/QHP OP CAR RHAB W/ECG: CPT

## 2024-09-13 ENCOUNTER — HOSPITAL ENCOUNTER (OUTPATIENT)
Dept: CARDIAC REHAB | Age: 54
Setting detail: THERAPIES SERIES
Discharge: HOME | End: 2024-09-13
Payer: COMMERCIAL

## 2024-09-13 PROCEDURE — 93798 PHYS/QHP OP CAR RHAB W/ECG: CPT

## 2024-10-14 ENCOUNTER — HOSPITAL ENCOUNTER (OUTPATIENT)
Dept: CARDIAC REHAB | Age: 54
Setting detail: THERAPIES SERIES
Discharge: HOME | End: 2024-10-14
Payer: COMMERCIAL

## 2024-10-14 PROCEDURE — 93798 PHYS/QHP OP CAR RHAB W/ECG: CPT

## 2024-11-01 ENCOUNTER — TELEPHONE (OUTPATIENT)
Dept: CARDIAC REHAB | Age: 54
End: 2024-11-01

## 2024-11-01 NOTE — TELEPHONE ENCOUNTER
Pt called back about cardiac rehab. He is still interested in the program and wants his chart placed on hold and he will stop in and exercise when he is able to around the care of his son with Epilepsy.